# Patient Record
(demographics unavailable — no encounter records)

---

## 2024-11-10 NOTE — END OF VISIT
[FreeTextEntry3] : I, Antonino Casey, acted as a scribe on behalf of Dr. Melina Kuhn M.D. on 11/08/2024.   All medical entries made by the scribe were at my, Dr. Melina Kuhn M.D., direction and personally dictated by me on 11/08/2024. I have reviewed the chart and agree that the record accurately reflects my personal performance of the history, physical exam, assessment and plan. I have also personally directed, reviewed, and agreed with the chart.

## 2024-11-10 NOTE — HISTORY OF PRESENT ILLNESS
[Pain is well-controlled] : pain is well-controlled [Clean/Dry/Intact] : clean, dry and intact [None] : no vaginal bleeding [Normal] : normal [Pathology reviewed] : pathology reviewed [de-identified] : SSE: Cuff intact [Fever] : no fever [Chills] : no chills [Nausea] : no nausea [Vomiting] : no vomiting [Erythema] : not erythematous [de-identified] : 10/20/2024 [de-identified] : Total Laparoscopy Hysterectomy, Bilateral Salpingectomy,  Lysis of Adhesions, Cystoscopy [de-identified] : 46 yo presents for a post-op visit s/p TL on 10/20/2024. She was discharged on POD 0. Pt complains of folliculitis on groin area and has irritation at 3 on a scale of 10. Pt complains of cramping during bowl movement and subsides after. Pt stated that they had boil that burst after warm compress.

## 2024-11-10 NOTE — HISTORY OF PRESENT ILLNESS
[Pain is well-controlled] : pain is well-controlled [Clean/Dry/Intact] : clean, dry and intact [None] : no vaginal bleeding [Normal] : normal [Pathology reviewed] : pathology reviewed [de-identified] : SSE: Cuff intact [Fever] : no fever [Chills] : no chills [Nausea] : no nausea [Vomiting] : no vomiting [Erythema] : not erythematous [de-identified] : 10/20/2024 [de-identified] : Total Laparoscopy Hysterectomy, Bilateral Salpingectomy,  Lysis of Adhesions, Cystoscopy [de-identified] : 48 yo presents for a post-op visit s/p TL on 10/20/2024. She was discharged on POD 0. Pt complains of folliculitis on groin area and has irritation at 3 on a scale of 10. Pt complains of cramping during bowl movement and subsides after. Pt stated that they had boil that burst after warm compress.

## 2024-11-10 NOTE — PLAN
[FreeTextEntry1] : 46 yo presents for a post-op visit s/p TL on 10/20/2024.   PLAN -Disscussed post-op restrictions -rx clinda topical  rto 6wk poc/cuff check

## 2024-11-10 NOTE — END OF VISIT
[FreeTextEntry3] : I, Antonino Casey, acted as a scribe on behalf of Dr. Melina Khun M.D. on 11/08/2024.   All medical entries made by the scribe were at my, Dr. Melina Kuhn M.D., direction and personally dictated by me on 11/08/2024. I have reviewed the chart and agree that the record accurately reflects my personal performance of the history, physical exam, assessment and plan. I have also personally directed, reviewed, and agreed with the chart.

## 2024-11-10 NOTE — PLAN
[FreeTextEntry1] : 48 yo presents for a post-op visit s/p TL on 10/20/2024.   PLAN -Disscussed post-op restrictions -rx clinda topical  rto 6wk poc/cuff check

## 2024-12-01 NOTE — ASSESSMENT
[Doing Well] : is doing well [No Sign of Infection] : is showing no signs of infection [de-identified] : 46 yo presents for a post-op visit s/p [facility] on 10/20/2024. Stable

## 2024-12-01 NOTE — PLAN
[FreeTextEntry1] : 46 yo presents for a post-op visit s/p [facility] on 10/22/2024. Stable  - Pt to do urine cx after completing course of Fosfomycin  - Discussed activity restrictions until fully healed-  advised Dec 31 - Cleared for nml activity 6wk post op aside from pelvic rest until 8wks  RTO annual

## 2024-12-01 NOTE — ASSESSMENT
[Doing Well] : is doing well [No Sign of Infection] : is showing no signs of infection [de-identified] : 46 yo presents for a post-op visit s/p [facility] on 10/20/2024. Stable

## 2024-12-01 NOTE — HISTORY OF PRESENT ILLNESS
[Pain is well-controlled] : pain is well-controlled [Clean/Dry/Intact] : clean, dry and intact [None] : no vaginal bleeding [Normal] : normal [Pathology reviewed] : pathology reviewed [Fever] : no fever [Chills] : no chills [Nausea] : no nausea [Vomiting] : no vomiting [Diarrhea] : no diarrhea [Vaginal Bleeding] : no vaginal bleeding [Pelvic Pressure] : no pelvic pressure [Dysuria] : no dysuria [Vaginal Discharge] : no vaginal discharge [Constipation] : no constipation [Erythema] : not erythematous [de-identified] : 10/22/2024 [de-identified] : Total Laparoscopy Hysterectomy, Bilateral Salpingectomy,  Lysis of Adhesions, Cystoscopy [de-identified] : 46 yo presents for a post-op visit s/p [facility] on 10/22/2024. She was discharged on POD 0. Pt had a UA done , UCX positive and resistant to Keflex. Pt was advised to treat with Fosfomycin 3gm oral packet once every 72 hours for 3 doses total. Pt is still in process of completing course. Pt was taking Keflex, completed course, UTI sx were improved. Reports mild pain on incision site occasionally. Denies any bleeding. Otherwise doing well post-operatively. Pt is planning to return to work 12/4.

## 2024-12-01 NOTE — PLAN
[FreeTextEntry1] : 48 yo presents for a post-op visit s/p [facility] on 10/22/2024. Stable  - Pt to do urine cx after completing course of Fosfomycin  - Discussed activity restrictions until fully healed-  advised Dec 31 - Cleared for nml activity 6wk post op aside from pelvic rest until 8wks  RTO annual

## 2024-12-01 NOTE — HISTORY OF PRESENT ILLNESS
[Pain is well-controlled] : pain is well-controlled [Clean/Dry/Intact] : clean, dry and intact [None] : no vaginal bleeding [Normal] : normal [Pathology reviewed] : pathology reviewed [Fever] : no fever [Chills] : no chills [Nausea] : no nausea [Vomiting] : no vomiting [Diarrhea] : no diarrhea [Vaginal Bleeding] : no vaginal bleeding [Pelvic Pressure] : no pelvic pressure [Dysuria] : no dysuria [Vaginal Discharge] : no vaginal discharge [Constipation] : no constipation [Erythema] : not erythematous [de-identified] : 10/22/2024 [de-identified] : Total Laparoscopy Hysterectomy, Bilateral Salpingectomy,  Lysis of Adhesions, Cystoscopy [de-identified] : 46 yo presents for a post-op visit s/p [facility] on 10/22/2024. She was discharged on POD 0. Pt had a UA done , UCX positive and resistant to Keflex. Pt was advised to treat with Fosfomycin 3gm oral packet once every 72 hours for 3 doses total. Pt is still in process of completing course. Pt was taking Keflex, completed course, UTI sx were improved. Reports mild pain on incision site occasionally. Denies any bleeding. Otherwise doing well post-operatively. Pt is planning to return to work 12/4.

## 2024-12-01 NOTE — END OF VISIT
[FreeTextEntry3] :   I, Claudine Heather, acted as a scribe on behalf of Dr. Melina Kuhn M.D  on 11/27/2024.   All medical entries made by the scribe were at my, Dr. Melina Kuhn M.D ,  direction and personally dictated by me on 11/27/2024. I have reviewed the chart and agree that the record accurately reflects my personal performance of the history, physical exam, assessment and plan. I have also personally directed, reviewed, and agreed with the chart.

## 2025-04-03 NOTE — END OF VISIT
[FreeTextEntry3] : I, Antonino Casey, acted as a scribe on behalf of Dr. Radha Lee D.O. on 04/03/2025.   All medical entries made by the scribe were at my, Dr. Radha Lee D.O., direction and personally dictated by me on 04/03/2025. I have reviewed the chart and agree that the record accurately reflects my personal performance of the history, physical exam, assessment and plan. I have also personally directed, reviewed, and agreed with the chart.

## 2025-04-03 NOTE — HISTORY OF PRESENT ILLNESS
[FreeTextEntry1] : 47 yr old presents for a f/u appointment. Pt complains of perimenopausal sx. Pt experiences hot flashes, moodiness, and vaginal dryness. Pt is s/p Total Laparoscopy Hysterectomy 10/2024.   SurgHx:Total Laparoscopy Hysterectomy, Bilateral Salpingectomy (10/22/2024 ), OBHx: none GynHx: fibroids, vaginal dryness, hot flashes PMHx: anemia PSHx: bunionectomy FHx: DM (mother, father), HTN (mother, father), HLD (father), blood clots (sibling), bleeding disorder (sibling), fatty liver disease (mother) SHx: none, alcohol use 1x a week, lives in [place] Current meds: Lupron 3.75 mg injections last done 7/31/23, black cohash, evening primrose,vitamin D Allergies: NKDA, eggplant

## 2025-04-03 NOTE — PLAN
[FreeTextEntry1] : 47 yr old presents for a f/u appointment.   PLAN -Discussed OTC vs hormone replacement therapy -Discussed risk/benefit/ alternatives -Rx Estradiol patch

## 2025-04-03 NOTE — REASON FOR VISIT
"Subjective    Britton Vogt Jr. is a 29 y.o. male here for:  Chief Complaint   Patient presents with   • ADHD     medication follow up, [facility]       History per MA reviewed.    Took Vyvanse in college and it helped, thinks it was a higher dose  On Vyvanse 30 mg he has noticed zero effect. No benefits and no side effects.          The following portions of the patient's history were reviewed and updated as appropriate: allergies, current medications, past family history, past medical history, past social history, past surgical history and problem list.    Review of Systems   Constitutional: Negative for fever.   Respiratory: Negative for shortness of breath.          Objective   Visit Vitals  /82 (BP Location: Left arm, Patient Position: Sitting, Cuff Size: Adult)   Pulse 61   Temp 97.1 °F (36.2 °C) (Temporal)   Resp 16   Ht 182.9 cm (72\")   Wt (!) 139 kg (307 lb)   SpO2 95%   BMI 41.64 kg/m²       Physical Exam  Vitals and nursing note reviewed.   Constitutional:       General: He is not in acute distress.     Appearance: Normal appearance. He is well-developed and well-groomed. He is not ill-appearing, toxic-appearing or diaphoretic.      Interventions: Face mask in place.   HENT:      Head: Normocephalic and atraumatic.      Right Ear: Hearing normal.      Left Ear: Hearing normal.   Eyes:      General: Lids are normal. No scleral icterus.     Extraocular Movements: Extraocular movements intact.   Cardiovascular:      Rate and Rhythm: Normal rate and regular rhythm.   Pulmonary:      Effort: Pulmonary effort is normal.   Musculoskeletal:      Cervical back: Neck supple.   Skin:     Coloration: Skin is not jaundiced or pale.   Neurological:      General: No focal deficit present.      Mental Status: He is alert and oriented to person, place, and time.   Psychiatric:         Attention and Perception: Attention and perception normal.         Mood and Affect: Mood and affect normal.         Speech: " Speech normal.         Behavior: Behavior normal. Behavior is cooperative.         Thought Content: Thought content normal.         Cognition and Memory: Cognition and memory normal.         Judgment: Judgment normal.         For medical decision making review of the following was required:  Lab Results   Component Value Date    WBC 5.83 2020    HGB 14.2 2020    HCT 42.0 2020    MCV 88.6 2020     2020     Lab Results   Component Value Date    GLUCOSE 93 2020    BUN 10 2020    CREATININE 0.97 2020    EGFRIFNONA 108 2018    EGFRIFAFRI 112 2020    BCR 10.3 2020    K 4.1 2020    CO2 26.8 2020    CALCIUM 9.6 2020    PROTENTOTREF 7.3 2018    ALBUMIN 4.20 2020    LABIL2 1.8 2018    AST 35 2020    ALT 37 2020         Assessment/Plan     Problem List Items Addressed This Visit        Mental Health    Attention deficit hyperactivity disorder (ADHD), predominantly inattentive type - Primary    Overview     See [employer] for psychological testing performed by Melly Barber, PhD. Dates of testin11, 11, 10/3/11, and 10/10/11.         Relevant Medications    lisdexamfetamine (Vyvanse) 50 MG capsule    Other Relevant Orders    Urine Drug Screen - Urine, Clean Catch       Other    COVID-19 vaccination declined    Influenza vaccination declined      Other Visit Diagnoses     Medication monitoring encounter        Relevant Orders    Urine Drug Screen - Urine, Clean Catch          · ABDIFATAH reviewed and appropriate.  Contract signed. UDS obtained. Increased dose and follow up in a month via [employer].     Return in about 4 weeks (around 2022) for [facility] adhd follow up.     Eliza Mayberry MD   [Follow-Up] : a follow-up evaluation of